# Patient Record
Sex: FEMALE | Race: WHITE | Employment: UNEMPLOYED | URBAN - METROPOLITAN AREA
[De-identification: names, ages, dates, MRNs, and addresses within clinical notes are randomized per-mention and may not be internally consistent; named-entity substitution may affect disease eponyms.]

---

## 2017-01-12 DIAGNOSIS — F41.9 ANXIETY: Primary | ICD-10-CM

## 2017-01-12 NOTE — TELEPHONE ENCOUNTER
ALPRAZolam       Last Written Prescription Date:  09/30/16  Last Fill Quantity: 45,   # refills: 0  Last Office Visit with [facility], [facility] or [facility] prescribing provider: 09/20/16  Future Office visit:    Next 5 appointments (look out 90 days)     Feb 23, 2017 12:00 PM   [facility]   [facility]    [address] [address] [phone]                   Routing refill request to provider for review/approval because:  Drug is a controlled substance

## 2017-01-13 RX ORDER — ALPRAZOLAM 1 MG
1 TABLET ORAL 3 TIMES DAILY PRN
Qty: 45 TABLET | Refills: 0 | OUTPATIENT
Start: 2017-01-13

## 2017-01-17 DIAGNOSIS — F41.9 ANXIETY: Primary | ICD-10-CM

## 2017-01-18 NOTE — TELEPHONE ENCOUNTER
Controlled Substance Refill Request for Alprazolam  Problem List Complete:  No     PROVIDER TO CONSIDER COMPLETION OF PROBLEM LIST AND OVERVIEW/CONTROLLED SUBSTANCE AGREEMENT    Last Written Prescription Date:  9/30/16  Last Fill Quantity: 45,   # refills: 0    Last Office Visit with [facility] primary care provider: 9/20/16    Future Office visit:   Next 5 appointments (look out 90 days)     Feb 23, 2017 12:00 PM   Office Visit with Ciera Kumar MD   [facility]    [address] [place] [address] [phone]                  Controlled substance agreement on file: No.     Processing:  Fax Rx to [facility] pharmacy   checked in past 6 months?  Unable to access, PMD not listed under master account

## 2017-01-18 NOTE — TELEPHONE ENCOUNTER
ALPRAZolam (XANAX) 1 MG tablet        Last Written Prescription Date:  9/30/16  Last Fill Quantity: 45,   # refills: 0  Last Office Visit with [facility] prescribing provider: 9/20/16  Future Office visit:    Next 5 appointments (look out 90 days)     Feb 23, 2017 12:00 PM   Office Visit with Ciera Kumar MD   [facility]    [address] [address] [phone]                   Routing refill request to provider for review/approval because:  Drug not on the [facility], [facility] or [facility] refill protocol or controlled substance

## 2017-01-19 RX ORDER — ALPRAZOLAM 1 MG
1 TABLET ORAL 3 TIMES DAILY PRN
Qty: 10 TABLET | Refills: 0 | Status: SHIPPED | OUTPATIENT
Start: 2017-01-19 | End: 2017-03-10

## 2017-01-19 NOTE — TELEPHONE ENCOUNTER
Pt has an appt on 02/23/2017, she is out of medication and wanting a refill today, please advise, thanks, Andreina Giraldo

## 2017-01-20 NOTE — TELEPHONE ENCOUNTER
Lorazepam is not supposed to be a maintenance medication. If she is still having breakthrough anxiety sxs then she needs to see psychiatry. I will however give her a refill for a small amount pending her office visit.

## 2017-01-20 NOTE — TELEPHONE ENCOUNTER
rx alprazolam xanax 1mg faxed to [facility] [id number] FAX: [phone]

## 2017-02-23 ENCOUNTER — OFFICE VISIT (OUTPATIENT)
Dept: INTERNAL MEDICINE | Facility: CLINIC | Age: 37
End: 2017-02-23
Payer: COMMERCIAL

## 2017-02-23 VITALS
DIASTOLIC BLOOD PRESSURE: 84 MMHG | BODY MASS INDEX: 25.34 KG/M2 | HEIGHT: 63 IN | OXYGEN SATURATION: 98 % | SYSTOLIC BLOOD PRESSURE: 118 MMHG | WEIGHT: 143 LBS | HEART RATE: 83 BPM | TEMPERATURE: 97.7 F

## 2017-02-23 DIAGNOSIS — E54 SCURVY: ICD-10-CM

## 2017-02-23 DIAGNOSIS — R53.82 CHRONIC FATIGUE: ICD-10-CM

## 2017-02-23 DIAGNOSIS — E53.8 VITAMIN B 12 DEFICIENCY: ICD-10-CM

## 2017-02-23 DIAGNOSIS — F41.1 GENERALIZED ANXIETY DISORDER: ICD-10-CM

## 2017-02-23 DIAGNOSIS — E78.5 HYPERLIPIDEMIA WITH TARGET LDL LESS THAN 130: ICD-10-CM

## 2017-02-23 DIAGNOSIS — G47.00 INSOMNIA, UNSPECIFIED TYPE: ICD-10-CM

## 2017-02-23 DIAGNOSIS — I10 ESSENTIAL HYPERTENSION WITH GOAL BLOOD PRESSURE LESS THAN 130/80: Primary | ICD-10-CM

## 2017-02-23 DIAGNOSIS — E61.1 IRON DEFICIENCY: ICD-10-CM

## 2017-02-23 PROCEDURE — 99215 OFFICE O/P EST HI 40 MIN: CPT | Performed by: INTERNAL MEDICINE

## 2017-02-23 RX ORDER — VENLAFAXINE HYDROCHLORIDE 37.5 MG/1
CAPSULE, EXTENDED RELEASE ORAL
Qty: 60 CAPSULE | Refills: 3 | Status: SHIPPED | OUTPATIENT
Start: 2017-02-23

## 2017-02-23 RX ORDER — METOPROLOL SUCCINATE 50 MG/1
50 TABLET, EXTENDED RELEASE ORAL 2 TIMES DAILY
Qty: 180 TABLET | Status: SHIPPED | OUTPATIENT
Start: 2017-02-23

## 2017-02-23 RX ORDER — TRAZODONE HYDROCHLORIDE 50 MG/1
50-100 TABLET, FILM COATED ORAL AT BEDTIME
Qty: 60 TABLET | Refills: 3 | Status: SHIPPED | OUTPATIENT
Start: 2017-02-23 | End: 2017-10-29

## 2017-02-23 NOTE — PATIENT INSTRUCTIONS
** FOLLOW UP PLAN**:    PLEASE SCHEDULE FASTING LABS WITH OFFICE VISIT SOONEST AVAILABILITY FROM TODAY TO FOLLOW UP ON  GENERALIZED ANXIETY DISORDER SYMPTOMS, INSOMNIA, AND TO REVIEW RESULTS OF PSYCHIATRIC EVALUATION     BE SURE TO SCHEDULE YOUR LAB DRAW APPOINTMENT FOR AT LEAST 1 WEEK BEFORE YOUR NEXT VISIT      YOU HAVE BEEN REFERRED TO PSYCHIATRY TO ADDRESS ISSUES RAISED TODAY REGARDING management of generalized anxiety disorder and insomnia ,  PLEASE  MAKE SURE TO SCHEDULE your APPOINTMENT(S) BY TELEPHONE      YOU MAY CONTACT THE CLINIC IF ANY QUESTIONS OR CONCERNS -[phone] OR VIA [place]

## 2017-02-23 NOTE — MR AVS SNAPSHOT
After Visit Summary   2/23/2017    Juanita Sharma    MRN: [id number]           Patient Information     Date Of Birth          1980        Visit Information        Provider Department      2/23/2017 12:00 PM Ciera Kumar MD [facility]        Today's Diagnoses     Essential hypertension with goal blood pressure less than 130/80    -  1    Iron deficiency        Chronic fatigue        Generalized anxiety disorder        Insomnia, unspecified type        Hyperlipidemia with target LDL less than 130        Scurvy        Vitamin B 12 deficiency          Care Instructions    ** FOLLOW UP PLAN**:    PLEASE SCHEDULE FASTING LABS WITH OFFICE VISIT SOONEST AVAILABILITY FROM TODAY TO FOLLOW UP ON  GENERALIZED ANXIETY DISORDER SYMPTOMS, INSOMNIA, AND TO REVIEW RESULTS OF PSYCHIATRIC EVALUATION     BE SURE TO SCHEDULE YOUR LAB DRAW APPOINTMENT FOR AT LEAST 1 WEEK BEFORE YOUR NEXT VISIT      YOU HAVE BEEN REFERRED TO PSYCHIATRY TO ADDRESS ISSUES RAISED TODAY REGARDING management of generalized anxiety disorder and insomnia ,  PLEASE  MAKE SURE TO SCHEDULE your APPOINTMENT(S) BY TELEPHONE      YOU MAY CONTACT THE CLINIC IF ANY QUESTIONS OR CONCERNS -[phone] OR VIA [facility]                   Follow-ups after your visit        Additional Services     MENTAL HEALTH REFERRAL       Your provider [place] referred you to: [facility] - [facility] [phone]   [email or web address]   *Referral from [facility] Care Provider required - Consultation Model - medication management & future refills will be returned to [facility] PCP upon completion of evaluation  *Please call to schedule an appointment.    All scheduling is subject to the client's specific insurance plan & benefits, provider/location availability, and provider clinical specialities.  Please arrive 15  minutes early for your first appointment and bring your completed paperwork.    Please be aware that coverage of these services is subject to the terms and limitations of your health insurance plan.  Call member services at your health plan with any benefit or coverage questions.                  Future tests that were ordered for you today     Open Future Orders        Priority Expected Expires Ordered    Lipid panel reflex to direct LDL Routine 2/23/2017 8/23/2017 2/23/2017    Comprehensive metabolic panel Routine 2/23/2017 8/23/2017 2/23/2017    Vitamin C Routine 2/23/2017 8/23/2017 2/23/2017    TSH Routine 2/23/2017 8/23/2017 2/23/2017    T4 free Routine 2/23/2017 8/23/2017 2/23/2017            Who to contact     If you have questions or need follow up information about today's clinic visit or your schedule please contact [facility] directly at [phone].  Normal or non-critical lab and imaging results will be communicated to you by MyChart, letter or phone within 4 business days after the clinic has received the results. If you do not hear from us within 7 days, please contact the clinic through [employer] or phone. If you have a critical or abnormal lab result, we will notify you by phone as soon as possible.  Submit refill requests through [employer] or call your pharmacy and they will forward the refill request to us. Please allow 3 business days for your refill to be completed.          Additional Information About Your Visit        [employer] Information     [employer] gives you secure access to your electronic health record. If you see a primary care provider, you can also send messages to your care team and make appointments. If you have questions, please call your primary care clinic.  If you do not have a primary care provider, please call [phone] and they will assist you.        Care EveryWhere ID     This is your Care EveryWhere ID. This could be used by other organizations to  "access your [facility] medical records  [id number]        Your Vitals Were     Pulse Temperature Height Last Period Pulse Oximetry BMI (Body Mass Index)    83 97.7  F (36.5  C) (Oral) 5' 3\" (1.6 m) 02/10/2017 (Approximate) 98% 25.33 kg/m2       Blood Pressure from Last 3 Encounters:   02/23/17 118/84   09/20/16 124/84   05/17/16 124/84    Weight from Last 3 Encounters:   02/23/17 143 lb (64.9 kg)   09/20/16 142 lb 9.6 oz (64.7 kg)   05/17/16 141 lb 3.2 oz (64 kg)              We Performed the Following     MENTAL HEALTH REFERRAL          Today's Medication Changes          These changes are accurate as of: 2/23/17 12:48 PM.  If you have any questions, ask your nurse or doctor.               Start taking these medicines.        Dose/Directions    venlafaxine 37.5 MG 24 hr capsule   Commonly known as:  EFFEXOR-XR   Used for:  Generalized anxiety disorder   Replaces:  busPIRone 7.5 MG tablet   Started by:  Ciera Kumar MD        Take 1 capsule daily for 14 days, then take 2 capsules daily. INDICATION: ANXIETY DISORDER   Quantity:  60 capsule   Refills:  3         These medicines have changed or have updated prescriptions.        Dose/Directions    ferrous sulfate Dried 140 (45 FE) MG Tbcr   This may have changed:  when to take this   Used for:  Iron deficiency, Chronic fatigue   Changed by:  Ciera Kumar MD        Dose:  1 tablet   Take 1 tablet by mouth every morning (before breakfast) IRON SUPPLEMENT - PLEASE TAKE WITH 4 OZ OF OJ WITH PULP, SUBSTITUTION OF IRON SUPPLEMENT PERMITTED   Quantity:  90 tablet   Refills:  PRN       traZODone 50 MG tablet   Commonly known as:  DESYREL   This may have changed:  how much to take   Used for:  Insomnia, unspecified type   Changed by:  Ciera Kumar MD        Dose:   mg   Take 1-2 tablets ( mg) by mouth At Bedtime INDICATION: INSOMNIA   Quantity:  60 tablet   Refills:  3         Stop taking these medicines if you haven't already. Please contact " your care team if you have questions.     busPIRone 7.5 MG tablet   Commonly known as:  BUSPAR   Replaced by:  venlafaxine 37.5 MG 24 hr capsule   Stopped by:  Ciera Kumar MD                Where to get your medicines      These medications were sent to [facility]/pharmacy #[id number] - [place] - [address] corner of [facility]  [address] [address] [facility] [address]     Phone:  [phone]     ferrous sulfate Dried 140 (45 FE) MG Tbcr    metoprolol 50 MG 24 hr tablet    traZODone 50 MG tablet    venlafaxine 37.5 MG 24 hr capsule                Primary Care Provider Office Phone # Fax #    Ciera Kumar [phone]       [facility] [address] [facility] [address]        Thank you!     Thank you for choosing [facility]  for your care. Our goal is always to provide you with excellent care. Hearing back from our patients is one way we can continue to improve our services. Please take a few minutes to complete the written survey that you may receive in the mail after your visit with us. Thank you!             Your Updated Medication List - Protect others around you: Learn how to safely use, store and throw away your medicines at www.disposemymeds.org.          This list is accurate as of: 2/23/17 12:48 PM.  Always use your most recent med list.                   Brand Name Dispense Instructions for use    ALPRAZolam 1 MG tablet    XANAX    10 tablet    Take 1 tablet (1 mg) by mouth 3 times daily as needed for anxiety       calcium-vitamin D-vitamin K 500-500-40 MG-UNT-MCG Chew    VIACTIV    100 tablet    Take 1 tablet by mouth 2 times daily (with meals) INDICATION: FOR BONE AND BREAST HEALTH       cholecalciferol 12300 UNITS capsule    VITAMIN D3    40 capsule    Take 1 capsule (50,000 Units) by mouth every 14 days INDICATION:TAKE ONE CAP EVERY OTHER WEEK, FOR VITAMIN D SUPPLEMENTATION (1ST AND 15TH OF EACH MONTH)       CRYSELLE-28 0.3-30  MG-MCG per tablet   Generic drug:  norgestrel-ethinyl estradiol     28 tablet    Take 1 tablet by mouth daily       Cyanocobalamin 5000 MCG/ML Liqd    B-12 SUPER STRENGTH    1 Bottle    Place 1 mL under the tongue daily FOR VITAMIN B12 SUPPLEMENTATION, PLEASE PLACE UNDER THE TONGUE       ferrous sulfate Dried 140 (45 FE) MG Tbcr     90 tablet    Take 1 tablet by mouth every morning (before breakfast) IRON SUPPLEMENT - PLEASE TAKE WITH 4 OZ OF OJ WITH PULP, SUBSTITUTION OF IRON SUPPLEMENT PERMITTED       folic acid 1 MG tablet    FOLVITE    100 tablet    Take 1 tablet (1 mg) by mouth daily INDICATION: VITAMIN SUPPLEMENTATION       magnesium oxide 400 MG tablet    MAG-OX    60 tablet    Take 1 tablet (400 mg) by mouth daily INDICATION: TO TREAT HEADACHES       metoprolol 50 MG 24 hr tablet    TOPROL-XL    180 tablet    Take 1 tablet (50 mg) by mouth 2 times daily INDICATION:TO LOWER BLOOD PRESSURE AND TO HELP PREVENT HEART DISEASE       prenatal multivitamin  plus iron 27-0.8 MG Tabs per tablet     100 tablet    Take 1 tablet by mouth daily INDICATION: VITAMIN SUPPLEMENTATION       SUMAtriptan 50 MG tablet    IMITREX    18 tablet    Take 1 tablet (50 mg) by mouth at onset of headache for migraine May repeat dose in 2 hours.  Do not exceed 200 mg in 24 hours       traZODone 50 MG tablet    DESYREL    60 tablet    Take 1-2 tablets ( mg) by mouth At Bedtime INDICATION: INSOMNIA       venlafaxine 37.5 MG 24 hr capsule    EFFEXOR-XR    60 capsule    Take 1 capsule daily for 14 days, then take 2 capsules daily. INDICATION: ANXIETY DISORDER

## 2017-02-23 NOTE — PROGRESS NOTES
"  SUBJECTIVE:                                                    Juanita Sharma is a 37 year old female who presents to clinic today for the following health issues:    Hypertension Follow-up      Outpatient blood pressures are being checked at home.  Results are diastolic is between 80-84.    Low Salt Diet: no added salt     Anxiety Follow-Up    Status since last visit: Worsened     Other associated symptoms:None    Complicating factors:   Significant life event: No   Current substance abuse: None  Depression symptoms: No  NEETA-7 SCORE 2/2/2016 3/3/2016 9/20/2016   Total Score - - -   Total Score 0 14 7        GAD7     Amount of exercise or physical activity: 2-3 days/week for an average of 15-30 minutes    Problems taking medications regularly: No    Medication side effects: yes Buspar is making patient dizzy  Diet: low salt    Other concerns raised today:  Low back pain      Duration: 4-5 days    Description (location/character/radiation): back, from the lower back up to the neck    Intensity:  moderate    Accompanying signs and symptoms: none    History (similar episodes/previous evaluation): yes    Precipitating or alleviating factors: None    Therapies tried and outcome: None   Has been improving.      Other significant issues as outlined and addressed in the plan section of this note.      Problem list and histories reviewed & adjusted, as indicated.  Additional history: as documented    Labs reviewed in EPIC  Problem list, Medication list, Allergies, and Medical/Social/Surgical histories reviewed in [employer] and updated as appropriate.    ROS:  14 point ROS negative except as above      OBJECTIVE:                                                    /84 (BP Location: Right arm, Patient Position: Chair, Cuff Size: Adult Regular)  Pulse 83  Temp 97.7  F (36.5  C) (Oral)  Ht 5' 3\" (1.6 m)  Wt 143 lb (64.9 kg)  LMP 02/10/2017 (Approximate)  SpO2 98%  BMI 25.33 kg/m2  Body mass index is 25.33 kg/(m^2).  GENERAL: " healthy, alert and no distress  NECK: no adenopathy, no asymmetry, masses, or scars and thyroid normal to palpation  RESP: lungs clear to auscultation - no rales, rhonchi or wheezes  CV: regular rate and rhythm, normal S1 S2, no S3 or S4, no murmur, click or rub, no peripheral edema and peripheral pulses strong  ABDOMEN: soft, nontender, no hepatosplenomegaly, no masses and bowel sounds normal  MS: tenderness to palpation cervical spine - mild    Diagnostic Test Results:  Results for orders placed or performed in visit on 09/13/16   Ferritin   Result Value Ref Range    Ferritin 94 12 - 150 ng/mL   Iron and iron binding capacity   Result Value Ref Range    Iron 160 35 - 180 ug/dL    Iron Binding Cap 376 240 - 430 ug/dL    Iron Saturation Index 43 15 - 46 %   Vitamin D Deficiency   Result Value Ref Range    Vitamin D Deficiency screening 63 20 - 75 ug/L   Magnesium   Result Value Ref Range    Magnesium 2.2 1.6 - 2.3 mg/dL        ASSESSMENT/PLAN:                                                    Anxiety; recurrent episode-- Moderate   Associated with the following complications:    None   Plan:  The following changes are made - see med list.  She has not been taking trazodone due to concerns about side effects especially relating to nightmares.  She has been encouraged to restart trazodone as noted. Her anxiety medication has been switched to Effexor which will be titrated up to 75 mg daily. She has also been given another referral for mental health evaluation, and help with medication management.    DIAGNOSIS/PLAN:     ICD-10-CM    1. Essential hypertension with goal blood pressure less than 130/80 I10 metoprolol (TOPROL-XL) 50 MG 24 hr tablet   2. Iron deficiency E61.1 ferrous sulfate Dried 140 (45 FE) MG TBCR   3. Chronic fatigue R53.82 ferrous sulfate Dried 140 (45 FE) MG TBCR   4. Generalized anxiety disorder F41.1 MENTAL HEALTH REFERRAL     venlafaxine (EFFEXOR-XR) 37.5 MG 24 hr capsule     TSH     T4 free   5.  Insomnia, unspecified type G47.00 traZODone (DESYREL) 50 MG tablet   6. Hyperlipidemia with target LDL less than 130 E78.5 Lipid panel reflex to direct LDL     Comprehensive metabolic panel   7. Scurvy E54 Vitamin C   8. Vitamin B 12 deficiency E53.8        SIGNIFICANT ISSUES RE The primary encounter diagnosis was Essential hypertension with goal blood pressure less than 130/80. Diagnoses of Iron deficiency, Chronic fatigue, Generalized anxiety disorder, Insomnia, unspecified type, Hyperlipidemia with target LDL less than 130, Scurvy, and Vitamin B 12 deficiency were also pertinent to this visit. AS NOTED AND ADDRESSED ABOVE   MEDS AND AND LABS AS ORDERED TO ADDRESS PREVIOUS AND CURRENT ABNORMAL INDICES    WITH REGARDS TO HER CHRONIC MEDICAL  ISSUES EVGENY IS DOING REALLY WELL BUT SHE IS DUE FOR FASTING LABS AT THIS TIME, THESE WILL BE ORDERED TODAY AND SHE WILL RETURN TO THE CLINIC AT HER EARLIEST CONVENIENCE TO HAVE THEM DRAWN    To address neck and back pain, she is to take nonsteroidal anti- inflammatory medications as needed and follow-up with orthopedics if the symptoms do not improve.    SEE PATIENT INSTRUCTION SECTION FOR FOLLOW UP PLAN    Evgeny IS TO CONTINUE OTHER TREATMENT REGIMEN/PLANS EXCEPT AS INDICATED    COMPLIANCE WITH MEDICATIONS DIET AND EXERCISE PLANS ENCOURAGED    DISCONTINUED MEDS:  Medications Discontinued During This Encounter   Medication Reason     ferrous sulfate Dried 140 (45 FE) MG TBCR Reorder     busPIRone (BUSPAR) 7.5 MG tablet Reorder     metoprolol (TOPROL-XL) 50 MG 24 hr tablet Reorder     traZODone (DESYREL) 50 MG tablet Reorder       CURRENT MED LIST WITH CHANGES AS NOTED BELOW:  Current Outpatient Prescriptions   Medication Sig Dispense Refill     ferrous sulfate Dried 140 (45 FE) MG TBCR Take 1 tablet by mouth every morning (before breakfast) IRON SUPPLEMENT - PLEASE TAKE WITH 4 OZ OF OJ WITH PULP, SUBSTITUTION OF IRON SUPPLEMENT PERMITTED 90 tablet PRN     venlafaxine (EFFEXOR-XR)  37.5 MG 24 hr capsule Take 1 capsule daily for 14 days, then take 2 capsules daily. INDICATION: ANXIETY DISORDER 60 capsule 3     metoprolol (TOPROL-XL) 50 MG 24 hr tablet Take 1 tablet (50 mg) by mouth 2 times daily INDICATION:TO LOWER BLOOD PRESSURE AND TO HELP PREVENT HEART DISEASE 180 tablet PRN     traZODone (DESYREL) 50 MG tablet Take 1-2 tablets ( mg) by mouth At Bedtime INDICATION: INSOMNIA 60 tablet 3     ALPRAZolam (XANAX) 1 MG tablet Take 1 tablet (1 mg) by mouth 3 times daily as needed for anxiety 10 tablet 0     calcium-vitamin D-vitamin K (VIACTIV) 500-500-40 MG-UNT-MCG CHEW Take 1 tablet by mouth 2 times daily (with meals) INDICATION: FOR BONE AND BREAST HEALTH 100 tablet PRN     cholecalciferol (VITAMIN D3) 72453 UNITS capsule Take 1 capsule (50,000 Units) by mouth every 14 days INDICATION:TAKE ONE CAP EVERY OTHER WEEK, FOR VITAMIN D SUPPLEMENTATION (1ST AND 15TH OF EACH MONTH) 40 capsule 0     magnesium oxide (MAG-OX) 400 MG tablet Take 1 tablet (400 mg) by mouth daily INDICATION: TO TREAT HEADACHES 60 tablet 3     folic acid (FOLVITE) 1 MG tablet Take 1 tablet (1 mg) by mouth daily INDICATION: VITAMIN SUPPLEMENTATION 100 tablet 3     Cyanocobalamin (B-12 SUPER STRENGTH) 5000 MCG/ML LIQD Place 1 mL under the tongue daily FOR VITAMIN B12 SUPPLEMENTATION, PLEASE PLACE UNDER THE TONGUE 1 Bottle PRN     norgestrel-ethinyl estradiol (CRYSELLE-28) 0.3-30 MG-MCG per tablet Take 1 tablet by mouth daily 28 tablet      SUMAtriptan (IMITREX) 50 MG tablet Take 1 tablet (50 mg) by mouth at onset of headache for migraine May repeat dose in 2 hours.  Do not exceed 200 mg in 24 hours 18 tablet 1     Prenatal Vit-Fe Fumarate-FA (PRENATAL MULTIVITAMIN  PLUS IRON) 27-0.8 MG TABS Take 1 tablet by mouth daily INDICATION: VITAMIN SUPPLEMENTATION 100 tablet 3     [DISCONTINUED] traZODone (DESYREL) 50 MG tablet Take 1 tablet (50 mg) by mouth At Bedtime INDICATION: INSOMNIA 90 tablet 3     [DISCONTINUED] metoprolol  (TOPROL-XL) 50 MG 24 hr tablet Take 1 tablet (50 mg) by mouth 2 times daily INDICATION:TO LOWER BLOOD PRESSURE AND TO HELP PREVENT HEART DISEASE 180 tablet PRN                     Tobacco Cessation:   reports that she has been smoking Cigarettes.  She has been smoking about 0.20 packs per day. She has never used smokeless tobacco.  Tobacco Cessation Action Plan: Information offered: Patient not interested at this time        ICD-10-CM    1. Essential hypertension with goal blood pressure less than 130/80 I10 metoprolol (TOPROL-XL) 50 MG 24 hr tablet   2. Iron deficiency E61.1 ferrous sulfate Dried 140 (45 FE) MG TBCR   3. Chronic fatigue R53.82 ferrous sulfate Dried 140 (45 FE) MG TBCR   4. Generalized anxiety disorder F41.1 MENTAL HEALTH REFERRAL     venlafaxine (EFFEXOR-XR) 37.5 MG 24 hr capsule     TSH     T4 free   5. Insomnia, unspecified type G47.00 traZODone (DESYREL) 50 MG tablet   6. Hyperlipidemia with target LDL less than 130 E78.5 Lipid panel reflex to direct LDL     Comprehensive metabolic panel   7. Scurvy E54 Vitamin C   8. Vitamin B 12 deficiency E53.8        Patient Instructions   ** FOLLOW UP PLAN**:    PLEASE SCHEDULE FASTING LABS WITH OFFICE VISIT SOONEST AVAILABILITY FROM TODAY TO FOLLOW UP ON  GENERALIZED ANXIETY DISORDER SYMPTOMS, INSOMNIA, AND TO REVIEW RESULTS OF PSYCHIATRIC EVALUATION     BE SURE TO SCHEDULE YOUR LAB DRAW APPOINTMENT FOR AT LEAST 1 WEEK BEFORE YOUR NEXT VISIT      YOU HAVE BEEN REFERRED TO PSYCHIATRY TO ADDRESS ISSUES RAISED TODAY REGARDING management of generalized anxiety disorder and insomnia ,  PLEASE  MAKE SURE TO SCHEDULE your APPOINTMENT(S) BY TELEPHONE      YOU MAY CONTACT THE CLINIC IF ANY QUESTIONS OR CONCERNS -[phone] OR VIA LAWRENCE Kumar MD  [facility]

## 2017-02-23 NOTE — NURSING NOTE
"Chief Complaint   Patient presents with     RECHECK       Initial /84 (BP Location: Right arm, Patient Position: Chair, Cuff Size: Adult Regular)  Pulse 83  Temp 97.7  F (36.5  C) (Oral)  Ht 5' 3\" (1.6 m)  Wt 143 lb (64.9 kg)  LMP 02/10/2017 (Approximate)  SpO2 98%  BMI 25.33 kg/m2 Estimated body mass index is 25.33 kg/(m^2) as calculated from the following:    Height as of this encounter: 5' 3\" (1.6 m).    Weight as of this encounter: 143 lb (64.9 kg).  Medication Reconciliation: complete    "

## 2017-03-10 DIAGNOSIS — F41.9 ANXIETY: ICD-10-CM

## 2017-03-10 NOTE — TELEPHONE ENCOUNTER
Controlled Substance Refill Request for alprazolam 1mg  Problem List Complete:  Yes    Last Written Prescription Date:  01/19/17  Last Fill Quantity:10 ,   # refills: 0    Last Office Visit with [facility] primary care provider: 02/23/17    Clinic visit frequency required: Q 3 months     Future Office visit: 05/25/17  Next 5 appointments (look out 90 days)     May 25, 2017 12:00 PM CDT   Office Visit with Ciera Kumar MD   [facility] ([facility])    [address] [address] [phone]                  Controlled substance agreement on file: No.     Processing:  Fax Rx to [facility] [id number] [place] [phone] pharmacy   checked in past 6 months?  No, route to RN

## 2017-03-14 RX ORDER — ALPRAZOLAM 1 MG
1 TABLET ORAL 3 TIMES DAILY PRN
Qty: 10 TABLET | Refills: 0 | Status: SHIPPED | OUTPATIENT
Start: 2017-03-14 | End: 2017-05-25 | Stop reason: ALTCHOICE

## 2017-03-15 NOTE — TELEPHONE ENCOUNTER
rx alprazolam xanax 1mg faxed to [facility] [id number] [place] FAX: [phone]

## 2017-05-18 DIAGNOSIS — G44.219 EPISODIC TENSION-TYPE HEADACHE, NOT INTRACTABLE: Primary | ICD-10-CM

## 2017-05-18 DIAGNOSIS — F41.9 ANXIETY: ICD-10-CM

## 2017-05-18 RX ORDER — SUMATRIPTAN 100 MG/1
TABLET, FILM COATED ORAL
Qty: 12 TABLET | Refills: 0 | Status: SHIPPED | OUTPATIENT
Start: 2017-05-18

## 2017-05-18 NOTE — TELEPHONE ENCOUNTER
Controlled Substance Refill Request for alprazolam  Problem List Complete:  Yes    Last Written Prescription Date:  03/14/17  Last Fill Quantity: 10,   # refills: 0    Last Office Visit with [facility] primary care provider: 02/23/17    Clinic visit frequency required: Q 3 months     Future Office visit: 07/20/17  Next 5 appointments (look out 90 days)     Jul 20, 2017 12:00 PM CDT   Office Visit with Ciera Kumar MD   [facility] ([facility])    [address] [address] [phone]                  Controlled substance agreement on file: No.     Processing:  [facility]ax Rx [facility]   checked in past 6 months?  No, route to RN

## 2017-05-18 NOTE — TELEPHONE ENCOUNTER
Imitrex approved till appt in July .Olga Shields RN  PCP please approve Lorazepam this should not need  check .Olga Shields RN

## 2017-05-25 RX ORDER — ALPRAZOLAM 1 MG
TABLET ORAL
Qty: 10 TABLET | Refills: 0 | OUTPATIENT
Start: 2017-05-25

## 2017-05-25 RX ORDER — ALPRAZOLAM 0.5 MG/1
1 TABLET, ORALLY DISINTEGRATING ORAL DAILY PRN
Qty: 12 TABLET | Refills: 0 | Status: SHIPPED | OUTPATIENT
Start: 2017-05-25

## 2017-05-25 NOTE — TELEPHONE ENCOUNTER
PCP reviewed and did not approve refill- would assume then intent not to refill.  Inform pt that her PCP reviewed encounter but did not approve refill.

## 2017-05-25 NOTE — TELEPHONE ENCOUNTER
Dr. Kumar out of office the last part of this week.     I reviewed chart.  This message started 5/18 and it just showed up in my inbox today.    Patient requesting Alprazolam.     Reviewed EPIC records and [facility] database, has only received occ. RXs for this since 2016.   6/6/16  #45  10/5/16 #45  1/20/17 #10  3/15/17 #10    It does not look like she has been receiving this as a prescription on a reuglar basis (or at least anything that has to be reported)    I do not prescribe regular doses of ultra short acting sedatives like this for chronic long standing anxiety issues.     This issue will need to be re-evaluated by primary MD, but in the meantime, OK to send in a small supply of Alprazolam.     However, based on there weight of 143#, I do NOT agree with the 1 mg size dose.   I am only willing to prescribe a small supply of Alprazolam 0.5 mg, to be used 1 times per day FOR SEVERE ANXIETY ONLY.   If she is taking this for tension headcahes or any other purpose,. Then there are other noncontroleld substance medications that can be used.     This is the extent of I am willing to offer her, if this is not acceptable, then she can wait until her primary MD is back in the clinic next week to discuss this with her.      If her anxiety is not well controlled (i.e. To the point where she may need to take short acting sedatives), then she needs to proceed with further evaluation, like the psychiatry appointment that was scheduled for her in April.

## 2017-06-10 ENCOUNTER — HEALTH MAINTENANCE LETTER (OUTPATIENT)
Age: 37
End: 2017-06-10

## 2017-10-29 DIAGNOSIS — G47.00 INSOMNIA, UNSPECIFIED TYPE: ICD-10-CM

## 2017-10-31 RX ORDER — TRAZODONE HYDROCHLORIDE 50 MG/1
TABLET, FILM COATED ORAL
Qty: 60 TABLET | Refills: 2 | Status: SHIPPED | OUTPATIENT
Start: 2017-10-31

## 2019-09-30 ENCOUNTER — HEALTH MAINTENANCE LETTER (OUTPATIENT)
Age: 39
End: 2019-09-30

## 2021-01-15 ENCOUNTER — HEALTH MAINTENANCE LETTER (OUTPATIENT)
Age: 41
End: 2021-01-15

## 2021-10-24 ENCOUNTER — HEALTH MAINTENANCE LETTER (OUTPATIENT)
Age: 41
End: 2021-10-24

## 2022-02-13 ENCOUNTER — HEALTH MAINTENANCE LETTER (OUTPATIENT)
Age: 42
End: 2022-02-13

## 2022-10-16 ENCOUNTER — HEALTH MAINTENANCE LETTER (OUTPATIENT)
Age: 42
End: 2022-10-16

## 2023-03-26 ENCOUNTER — HEALTH MAINTENANCE LETTER (OUTPATIENT)
Age: 43
End: 2023-03-26